# Patient Record
Sex: MALE | Race: WHITE
[De-identification: names, ages, dates, MRNs, and addresses within clinical notes are randomized per-mention and may not be internally consistent; named-entity substitution may affect disease eponyms.]

---

## 2024-03-05 PROBLEM — Z00.00 ENCOUNTER FOR PREVENTIVE HEALTH EXAMINATION: Status: ACTIVE | Noted: 2024-03-05

## 2024-03-07 ENCOUNTER — APPOINTMENT (OUTPATIENT)
Dept: ORTHOPEDIC SURGERY | Facility: CLINIC | Age: 69
End: 2024-03-07
Payer: OTHER MISCELLANEOUS

## 2024-03-07 VITALS
HEART RATE: 72 BPM | OXYGEN SATURATION: 97 % | DIASTOLIC BLOOD PRESSURE: 91 MMHG | SYSTOLIC BLOOD PRESSURE: 146 MMHG | TEMPERATURE: 97.8 F | BODY MASS INDEX: 31.83 KG/M2 | WEIGHT: 256 LBS | HEIGHT: 75 IN

## 2024-03-07 PROCEDURE — 72110 X-RAY EXAM L-2 SPINE 4/>VWS: CPT

## 2024-03-07 PROCEDURE — 99204 OFFICE O/P NEW MOD 45 MIN: CPT

## 2024-03-08 NOTE — HISTORY OF PRESENT ILLNESS
[No] : The patient is currently not working. [de-identified] : Judd presents with severe daily back and leg pain. He has attempted conservative treatment methods including activity modification, anti-inflammatory pain medications, acupuncture, chiropractic treatment, and two epidural steroid injections. He is scheduled for a third epidural steroid injection on Monday.

## 2024-03-08 NOTE — PLAN
[FreeTextEntry1] : - Administer a third epidural steroid injection on Monday.   - If symptoms persist after the third injection, repeat MRI in anticipation of decompression and fusion.   - Continue with current conservative treatments including activity modification and anti-inflammatory pain medication.   - Follow-up appointment to monitor progress post-injection and to determine the need for decompression and fusion.

## 2024-03-08 NOTE — END OF VISIT
[FreeTextEntry3] : I Diana Obrien, acting as scribe, attest that this documentation has been prepared under the direction and in the presence of Provider Weston Dominguez MD.   I was physically present during the service to the patient and/or personally examined the patient and I was directly involved in the management plan and recommendations of the care provided to the patient.   I Dr. Dominguez, reviewed the history, the physical exam, and plan as documented by the PA. The documentation recorded by the PA, in my presence, accurately reflects the service I personally performed, and the decisions made by me with my edits as appropriate.  Weston Dominguez MD

## 2024-03-08 NOTE — ASSESSMENT
[Physical Disability Temporary Total] : temporarily total disabled [FreeTextEntry1] : Judd is experiencing severe daily back and leg pain, which has not significantly improved with conservative treatment methods. He is scheduled for a third epidural steroid injection.

## 2024-04-30 ENCOUNTER — RX RENEWAL (OUTPATIENT)
Age: 69
End: 2024-04-30

## 2024-05-09 ENCOUNTER — APPOINTMENT (OUTPATIENT)
Dept: ORTHOPEDIC SURGERY | Facility: CLINIC | Age: 69
End: 2024-05-09
Payer: OTHER MISCELLANEOUS

## 2024-05-09 DIAGNOSIS — M54.50 LOW BACK PAIN, UNSPECIFIED: ICD-10-CM

## 2024-05-09 PROCEDURE — 99214 OFFICE O/P EST MOD 30 MIN: CPT

## 2024-05-24 RX ORDER — OXYCODONE AND ACETAMINOPHEN 5; 325 MG/1; MG/1
5-325 TABLET ORAL
Qty: 40 | Refills: 0 | Status: ACTIVE | COMMUNITY
Start: 2024-05-09 | End: 1900-01-01

## 2024-05-28 ENCOUNTER — NON-APPOINTMENT (OUTPATIENT)
Age: 69
End: 2024-05-28

## 2024-05-30 ENCOUNTER — NON-APPOINTMENT (OUTPATIENT)
Age: 69
End: 2024-05-30

## 2024-06-03 RX ORDER — TRAMADOL HYDROCHLORIDE 50 MG/1
50 TABLET, COATED ORAL
Qty: 60 | Refills: 0 | Status: ACTIVE | COMMUNITY
Start: 2024-06-03 | End: 1900-01-01

## 2024-06-03 NOTE — PHYSICAL EXAM
[de-identified] : In the interim, we reviewed his x-rays of his lumbar spine, which show disc height narrowing without evidence of gross deformity or instability.

## 2024-06-03 NOTE — PLAN
Detail Level: Detailed [FreeTextEntry1] : I recommended a new MRI and followed with the aforementioned films. Ultimately, following his knee replacements, he will likely require a decompression and fusion of the lumbar sacral spine. Prn percocet vs tramadol as needed for pain.  Add 42046 Cpt? (Important Note: In 2017 The Use Of 67980 Is Being Tracked By Cms To Determine Future Global Period Reimbursement For Global Periods): yes

## 2024-06-03 NOTE — ADDENDUM
[FreeTextEntry1] :  Documented by Talita Reyes acting as a scribe for Dr. Weston Dominguez. 05/09/2024

## 2024-06-03 NOTE — HISTORY OF PRESENT ILLNESS
[de-identified] : Judd Brito is here for follow-up regarding his lumbar spine. He has predominantly lower back pain, but it does radiate into his legs. He did well with bilateral hip replacements. He needs bilateral knee replacements. He saw Dr. Mahesh Jacome regarding this. He will first fix his left knee. Shortly thereafter, he will need right-sided total knee replacement. He has taken percocet in the past with moderate relief of pain.

## 2024-06-03 NOTE — END OF VISIT
[FreeTextEntry3] :  Documented by Talita Reyes acting as a scribe for Dr. Weston Dominguez. 05/09/2024   All medical record entries made by the Scribe were at my, Dr. Weston Dominguez. direction and personally dictated by me on 05/09/2024. I have reviewed the chart and agree that the record accurately reflects my personal performance of the history, physical exam, assessment and plan. I have also personally directed, reviewed, and agreed with the chart.

## 2024-06-05 ENCOUNTER — RX RENEWAL (OUTPATIENT)
Age: 69
End: 2024-06-05

## 2024-06-05 RX ORDER — DICLOFENAC SODIUM 75 MG/1
75 TABLET, DELAYED RELEASE ORAL
Qty: 60 | Refills: 0 | Status: ACTIVE | COMMUNITY
Start: 2024-03-28 | End: 1900-01-01

## 2024-07-17 ENCOUNTER — RX RENEWAL (OUTPATIENT)
Age: 69
End: 2024-07-17

## 2024-08-01 ENCOUNTER — APPOINTMENT (OUTPATIENT)
Dept: ORTHOPEDIC SURGERY | Facility: CLINIC | Age: 69
End: 2024-08-01
Payer: OTHER MISCELLANEOUS

## 2024-08-01 DIAGNOSIS — M54.50 LOW BACK PAIN, UNSPECIFIED: ICD-10-CM

## 2024-08-01 PROCEDURE — 99214 OFFICE O/P EST MOD 30 MIN: CPT

## 2024-08-02 NOTE — PHYSICAL EXAM
[de-identified] : MRI(06/05/2024): Consistent with multilevel neuroforaminal stenosis. Most consistent with the symptoms is the stenosis of the thyroid at L3-4 and L4-5 at the right with associated disc herniation.

## 2024-08-02 NOTE — PHYSICAL EXAM
[de-identified] : MRI(06/05/2024): Consistent with multilevel neuroforaminal stenosis. Most consistent with the symptoms is the stenosis of the thyroid at L3-4 and L4-5 at the right with associated disc herniation.

## 2024-08-02 NOTE — HISTORY OF PRESENT ILLNESS
[de-identified] : Judd Brito is here with prominently lower back and right-sided thigh pain. He has an MRI for review today. I have discouraged him from surgical intervention and referred him back to Dr. Arcenio Mace for transforaminal epidural steroid injection, right L3-4 and L4-5. The patient is anticipating a left total knee replacement with Dr. Jacome in the coming months. I'm hopeful that re-aligning his joints will improve his gait and hopefully his back and disc herniation related symptoms.

## 2024-08-02 NOTE — HISTORY OF PRESENT ILLNESS
[de-identified] : Judd Brito is here with prominently lower back and right-sided thigh pain. He has an MRI for review today. I have discouraged him from surgical intervention and referred him back to Dr. Arcenio Mace for transforaminal epidural steroid injection, right L3-4 and L4-5. The patient is anticipating a left total knee replacement with Dr. Jacome in the coming months. I'm hopeful that re-aligning his joints will improve his gait and hopefully his back and disc herniation related symptoms.

## 2024-08-02 NOTE — ADDENDUM
[FreeTextEntry1] : I, Mitra Estrada (scribe) assisted in filling out this chart under the dictation of Weston Dominguez on 08/01/2024.

## 2024-08-02 NOTE — HISTORY OF PRESENT ILLNESS
[de-identified] : Judd Brito is here with prominently lower back and right-sided thigh pain. He has an MRI for review today. I have discouraged him from surgical intervention and referred him back to Dr. Arcenio Mace for transforaminal epidural steroid injection, right L3-4 and L4-5. The patient is anticipating a left total knee replacement with Dr. Jacome in the coming months. I'm hopeful that re-aligning his joints will improve his gait and hopefully his back and disc herniation related symptoms.

## 2024-08-02 NOTE — END OF VISIT
[FreeTextEntry3] : Documented by Mitra Estrada acting as a scribe for Weston Dominguez on 08/01/2024  All medical record entries made by the Scribe were at my, Dr. Weston Dominguez direction and personally dictated by me on 08/01/2024. I have reviewed the chart and agree that the record accurately reflects my personal performance of the history, physical exam, assessment and plan. I have also personally directed, reviewed, and agreed with the chart.

## 2024-08-02 NOTE — PLAN
[TextEntry] : I have discouraged him from surgical intervention at this point as surgery of this genre would entail decompression and fusion and his propensity for disc degeneration, disc herniation predisposes him to a 100% risk of adjacent segment degeneration and does not leave us a good place to stop the fusion. Following our discussion, the patient verbalized understanding and was to proceed with ongoing conservative treatment. He will follow-up with us in three months' time.

## 2024-08-02 NOTE — PHYSICAL EXAM
[de-identified] : MRI(06/05/2024): Consistent with multilevel neuroforaminal stenosis. Most consistent with the symptoms is the stenosis of the thyroid at L3-4 and L4-5 at the right with associated disc herniation.

## 2024-08-05 ENCOUNTER — APPOINTMENT (OUTPATIENT)
Dept: ORTHOPEDIC SURGERY | Facility: CLINIC | Age: 69
End: 2024-08-05

## 2024-08-13 ENCOUNTER — RX RENEWAL (OUTPATIENT)
Age: 69
End: 2024-08-13

## 2024-09-10 ENCOUNTER — TRANSCRIPTION ENCOUNTER (OUTPATIENT)
Age: 69
End: 2024-09-10

## 2024-09-10 VITALS
HEIGHT: 75 IN | OXYGEN SATURATION: 97 % | DIASTOLIC BLOOD PRESSURE: 83 MMHG | HEART RATE: 69 BPM | SYSTOLIC BLOOD PRESSURE: 143 MMHG | RESPIRATION RATE: 18 BRPM | WEIGHT: 255.74 LBS | TEMPERATURE: 98 F

## 2024-09-10 RX ORDER — FLU VACCINE TS 2012-2013(5YR+) 45MCG/.5ML
0.5 VIAL (ML) INTRAMUSCULAR ONCE
Refills: 0 | Status: DISCONTINUED | OUTPATIENT
Start: 2024-09-11 | End: 2024-09-12

## 2024-09-10 RX ORDER — POVIDONE-IODINE 10 %
1 SOLUTION, NON-ORAL TOPICAL ONCE
Refills: 0 | Status: COMPLETED | OUTPATIENT
Start: 2024-09-11 | End: 2024-09-11

## 2024-09-10 NOTE — PRE-ANESTHESIA EVALUATION ADULT - NSANTHOSAYNRD_GEN_A_CORE
No. YASMANI screening performed.  STOP BANG Legend: 0-2 = LOW Risk; 3-4 = INTERMEDIATE Risk; 5-8 = HIGH Risk

## 2024-09-10 NOTE — PATIENT PROFILE ADULT - DO YOU EVER NEED HELP READING HOSPITAL MATERIALS?
Chronic Condition Documentation : Stable based on symptoms and exam.  Continue established treatment plan and follow-up at least yearly.   no

## 2024-09-10 NOTE — H&P ADULT - PROBLEM SELECTOR PLAN 1
Admit to Orthopaedic Service.  Presents today for elective Left TKR  Pt medically stable and cleared for procedure today by Dr. Chun

## 2024-09-10 NOTE — H&P ADULT - NSHPLABSRESULTS_GEN_ALL_CORE
Preop CBC, BMP, PT/PTT/INR, UA within normal limits- reviewed by medical clearance.  Cr: 0.86  H/H: 13.9/42.2  Preop EKG: NSR, 70bpm, reviewed by medical clearance.  CXR: Within normal limits, per medical clearance.  3M DOS

## 2024-09-10 NOTE — H&P ADULT - NSICDXPASTSURGICALHX_GEN_ALL_CORE_FT
PAST SURGICAL HISTORY:  H/O foot surgery B/L    H/O operation on finger cut-and reattached  middle finger and ring    History of hip surgery B/L

## 2024-09-10 NOTE — PATIENT PROFILE ADULT - NSPRESCRALCSCORE_GEN_A_NUR_CAL
Patient Education     Pharyngitis (Sore Throat), Report Pending     Pharyngitis (sore throat) is often due to a virus. It can also be caused by strep (streptococcus) bacteria. This is often called strep throat. Both viral and strep infections can cause throat pain that is worse when swallowing, aching all over, headache, and fever. Both types of infections are contagious. They may be spread by coughing, kissing, or touching others after touching your mouth or nose.   A test has been done to find out if you or your child have strep throat. Often a rapid strep test can be done which gives immediate results and treatment can begin right away. A throat culture may also be done. The culture results take longer. If you have a virus, the culture results will be negative. The facility will call with your culture results. Call this facility or your healthcare provider if you were not given your rapid test results for strep. If a test is positive for strep infection, you will need to take an antibiotic. An antibiotic is a medicine used to treat a bacterial infection. A prescription can be called into your pharmacy or a written copy will be given to you at that time. If both tests are negative, you likely have a virus, usually viral pharyngitis. This does not need to be treated with antibiotics. Until you receive the results of the rapid strep test or the throat culture, you should stay home from work. If your child is being tested, he or she should stay home from school.   Home care    Rest at home. Drink plenty of fluids so you won't get dehydrated.    If the test is positive for strep, you or your child should not go to work or school for the first 24 hours of taking the antibiotics or as directed by the healthcare provider. After this time, you or your child will not be contagious. You or your child can then return to work or school when feeling better or as directed by this facility or your healthcare provider.     Use  the antibiotic medicine (often penicillin or amoxicillin) for the full 10 days or as directed by the healthcare provider. Don't stop the medicine even if you or your child feel better. This is very important to make sure the infection is fully treated. It's also important to prevent medicine-resistant germs from growing. Treatment for 10 days is also the best way to prevent rheumatic fever which affects the heart and other parts of the body. If you or your child were given an antibiotic shot, the healthcare provider will tell you if additional antibiotics are needed.    Use throat lozenges or numbing throat sprays to help reduce pain. Gargling with warm salt water will also help reduce throat pain. Dissolve 1/2 teaspoon of salt in 1 glass of warm water. Discuss this treatment with your healthcare provider.    Children can sip on juice or ice pops. Children 5 years and older can also suck on a lollipop or hard candy.    Don't eat salty or spicy foods or give them to your child. These can irritate the throat.  Other medicine for a child:  You can give your child acetaminophen for fever, fussiness, or discomfort. In babies over 6 months of age, you may use ibuprofen instead of acetaminophen. If your child has chronic liver or kidney disease or ever had a stomach ulcer or gastrointestinal bleeding, talk with your child s healthcare provider before giving these medicines. Aspirin should never be used by any child under 18 years of age who has a fever. It may cause severe liver damage. Always contact your child's healthcare provider before giving him or her over-the-counter medicines for the first time.   Other medicine for an adult: You may use acetaminophen or ibuprofen to control pain or fever, unless another medicine was prescribed for this. If you have chronic liver or kidney disease or ever had a stomach ulcer or gastrointestinal bleeding, talk with your healthcare provider before using these medicines.   Follow-up  "care  Follow up with your healthcare provider or our staff if you or your child don't feel or get better within 72 hours or as directed.   When to get medical advice  Call your healthcare provider right away if any of these occur:     Your child has a fever (see \"Fever and children,\" below)    You have a fever of 100.4 F (38 C) or higher, or as directed    New or worsening ear pain, sinus pain, or headache    Painful lumps in the back of neck    Stiff or swollen neck    Lymph nodes are getting larger or swelling of the neck    Can t open mouth wide due to throat pain    Signs of dehydration, such as very dark urine or no urine or sunken eyes    Noisy breathing    Muffled voice    New rash    Symptoms are worse    New symptoms develop  Call 911  Call 911 if you have any of the following symptoms     Can't swallow, especially saliva, with a lot of drooling    Trouble or difficulty breathing or wheezing    Feeling faint or dizzy    Can't talk    Feeling of doom  Prevention  Here are steps you can take to help prevent an infection:     Keep good hand washing habits.    Don t have close contact with people who have sore throats, colds, or other upper respiratory infections.    Don t smoke, and stay away from secondhand smoke.    Stay up to date with of your vaccines.  Fever and children  Use a digital thermometer to check your child s temperature. Don t use a mercury thermometer. There are different kinds and uses of digital thermometers. They include:     Rectal. For children younger than 3 years, a rectal temperature is the most accurate.    Forehead (temporal). This works for children age 3 months and older. If a child under 3 months old has signs of illness, this can be used for a first pass. The provider may want to confirm with a rectal temperature.    Ear (tympanic). Ear temperatures are accurate after 6 months of age, but not before.    Armpit (axillary). This is the least reliable but may be used for a first " pass to check a child of any age with signs of illness. The provider may want to confirm with a rectal temperature.    Mouth (oral). Don t use a thermometer in your child s mouth until he or she is at least 4 years old.  Use the rectal thermometer with care. Follow the product maker s directions for correct use. Insert it gently. Label it and make sure it s not used in the mouth. It may pass on germs from the stool. If you don t feel OK using a rectal thermometer, ask the healthcare provider what type to use instead. When you talk with any healthcare provider about your child s fever, tell him or her which type you used.   Below are guidelines to know if your young child has a fever. Your child s healthcare provider may give you different numbers for your child. Follow your provider s specific instructions.   Fever readings for a baby under 3 months old:     First, ask your child s healthcare provider how you should take the temperature.    Rectal or forehead: 100.4 F (38 C) or higher    Armpit: 99 F (37.2 C) or higher  Fever readings for a child age 3 months to 36 months (3 years):     Rectal, forehead, or ear: 102 F (38.9 C) or higher    Armpit: 101 F (38.3 C) or higher  Call the healthcare provider in these cases:    Repeated temperature of 104 F (40 C) or higher in a child of any age    Fever of 100.4  (38 C) or higher in a baby younger than 3 months    Fever that lasts more than 24 hours in a child under age 2    Fever that lasts for 3 days in a child age 2 or older    [employer] last reviewed this educational content on 2/1/2020 2000-2021 [employer]. All rights reserved. This information is not intended as a substitute for professional medical care. Always follow your healthcare professional's instructions.            4

## 2024-09-10 NOTE — H&P ADULT - NSHPPHYSICALEXAM_GEN_ALL_CORE
Gen: Alert and oriented, NAD  MSK: Decreased ROM to left knee 2/2 pain     Rest of PE per medical clearance note Gen: Alert and oriented, NAD  MSK: Decreased ROM to left knee 2/2 pain   Skin without erythema, ecchymosis, left 5th metatarsal scab from 10 days ago, healing   Calves soft, nontender bilaterally   Sensation intact to light touch bilateral lower extremities  Pulses: brisk capillary refill  EHL/FHL/TA/GS 5/5 bilaterally      Rest of PE per medical clearance note

## 2024-09-10 NOTE — PRE-OP CHECKLIST - 1.
----- Message from Dhruv Marlow MD sent at 4/9/2020  1:43 PM CDT -----  Hemoglobin A1 c 7.8 on the higher side please watch the diet more closely and exercise   visitation policy explained

## 2024-09-10 NOTE — H&P ADULT - HISTORY OF PRESENT ILLNESS
68yo male with left knee osteoarthritis    Presents today for elective left total knee arthroplasty with Dr. Jacome 70yo male with left knee pain x over 2 years. He reports that the pain began spontaneously and without accident or trauma. Patient says that the pain persists and/or is worsening despite conservative measures of physical therapy and increasingly unresponsive to use of medications. Patient ambulates WITHOUT the use of a cane or walker for assistance at home. Denies numbness/tingling of lower extremities. Patient is able to ambulate 2 blocks without CP or SOB.     Denies history of blood clots or seizures. Denies chest pain, shortness of breath, nausea or vomiting today.    Patient HAS NOT been using opioid pain medications on a regular basis for more than 90 days prior to the date of surgery.     Presents today for elective left total knee arthroplasty with Dr. Jacome

## 2024-09-10 NOTE — PATIENT PROFILE ADULT - HAVE YOU EXPERIENCED VIOLENCE OR A TRAUMATIC EVENT?
Consult visit for discussion of Advance Care Planning/ Living Will. Pt states she has been meaning to complete but has not.  Goals and purpose of ACP discussed and reviewed.  Her questions answered to her satisfaction.  She requested that the document be left for her to review.  Contact numbers for facilitators  Given if she desires further assistance.  
no

## 2024-09-11 ENCOUNTER — INPATIENT (INPATIENT)
Facility: HOSPITAL | Age: 69
LOS: 0 days | Discharge: HOME HEALTH | DRG: 470 | End: 2024-09-12
Payer: OTHER MISCELLANEOUS

## 2024-09-11 DIAGNOSIS — M17.12 UNILATERAL PRIMARY OSTEOARTHRITIS, LEFT KNEE: ICD-10-CM

## 2024-09-11 DIAGNOSIS — I10 ESSENTIAL (PRIMARY) HYPERTENSION: ICD-10-CM

## 2024-09-11 DIAGNOSIS — Z98.890 OTHER SPECIFIED POSTPROCEDURAL STATES: Chronic | ICD-10-CM

## 2024-09-11 PROCEDURE — 73560 X-RAY EXAM OF KNEE 1 OR 2: CPT | Mod: 26,LT

## 2024-09-11 DEVICE — STEM EXT PERSONA 14MM PLUS 30M: Type: IMPLANTABLE DEVICE | Site: LEFT | Status: FUNCTIONAL

## 2024-09-11 DEVICE — ZIMMER FEMALE HEX SCREW MAGNETIC 2.5MM X 25MM: Type: IMPLANTABLE DEVICE | Site: LEFT | Status: FUNCTIONAL

## 2024-09-11 DEVICE — SURF ART PERSONA LT 12GH 10MM: Type: IMPLANTABLE DEVICE | Site: LEFT | Status: FUNCTIONAL

## 2024-09-11 DEVICE — CELLERATE SURGICAL POWDER RX 5GM: Type: IMPLANTABLE DEVICE | Site: LEFT | Status: FUNCTIONAL

## 2024-09-11 DEVICE — COMP FEM PERSONA LT PS CMT CCR STD SZ 12: Type: IMPLANTABLE DEVICE | Site: LEFT | Status: FUNCTIONAL

## 2024-09-11 DEVICE — STEM TIB PSN SZ H L 5 DEG: Type: IMPLANTABLE DEVICE | Site: LEFT | Status: FUNCTIONAL

## 2024-09-11 DEVICE — IMPLANTABLE DEVICE: Type: IMPLANTABLE DEVICE | Site: LEFT | Status: FUNCTIONAL

## 2024-09-11 DEVICE — ZIMMER/NEXGEN SMOOTH PIN 3.2X75MM: Type: IMPLANTABLE DEVICE | Site: LEFT | Status: FUNCTIONAL

## 2024-09-11 DEVICE — ZIMMER/NEXGEN HEX HEAD SCREW 3.5MM: Type: IMPLANTABLE DEVICE | Site: LEFT | Status: FUNCTIONAL

## 2024-09-11 DEVICE — PATELLA PERSONA VE CEMENTED 35MM: Type: IMPLANTABLE DEVICE | Site: LEFT | Status: FUNCTIONAL

## 2024-09-11 RX ORDER — HYDROMORPHONE HYDROCHLORIDE 2 MG/1
0.5 TABLET ORAL
Refills: 0 | Status: COMPLETED | OUTPATIENT
Start: 2024-09-11 | End: 2024-09-18

## 2024-09-11 RX ORDER — IRBESARTAN AND HYDROCHLOROTHIAZIDE 300; 12.5 MG/1; MG/1
1 TABLET ORAL
Refills: 0 | DISCHARGE

## 2024-09-11 RX ORDER — CHLORHEXIDINE GLUCONATE 40 MG/ML
1 SOLUTION TOPICAL ONCE
Refills: 0 | Status: COMPLETED | OUTPATIENT
Start: 2024-09-11 | End: 2024-09-11

## 2024-09-11 RX ORDER — FINASTERIDE 1 MG/1
1 TABLET, FILM COATED ORAL
Refills: 0 | DISCHARGE

## 2024-09-11 RX ORDER — ASPIRIN 81 MG
81 TABLET, DELAYED RELEASE (ENTERIC COATED) ORAL
Refills: 0 | Status: DISCONTINUED | OUTPATIENT
Start: 2024-09-11 | End: 2024-09-12

## 2024-09-11 RX ORDER — KETOROLAC TROMETHAMINE 30 MG/ML
15 INJECTION, SOLUTION INTRAMUSCULAR EVERY 6 HOURS
Refills: 0 | Status: DISCONTINUED | OUTPATIENT
Start: 2024-09-11 | End: 2024-09-12

## 2024-09-11 RX ORDER — OXYCODONE HYDROCHLORIDE 5 MG/1
10 TABLET ORAL
Refills: 0 | Status: DISCONTINUED | OUTPATIENT
Start: 2024-09-11 | End: 2024-09-12

## 2024-09-11 RX ORDER — CEFAZOLIN SODIUM 2 G/100ML
2000 INJECTION, SOLUTION INTRAVENOUS EVERY 8 HOURS
Refills: 0 | Status: COMPLETED | OUTPATIENT
Start: 2024-09-11 | End: 2024-09-11

## 2024-09-11 RX ORDER — CELECOXIB 400 MG/1
200 CAPSULE ORAL ONCE
Refills: 0 | Status: COMPLETED | OUTPATIENT
Start: 2024-09-11 | End: 2024-09-11

## 2024-09-11 RX ORDER — ACETAMINOPHEN 325 MG/1
1000 TABLET ORAL EVERY 8 HOURS
Refills: 0 | Status: DISCONTINUED | OUTPATIENT
Start: 2024-09-11 | End: 2024-09-12

## 2024-09-11 RX ORDER — HYDROMORPHONE HYDROCHLORIDE 2 MG/1
0.5 TABLET ORAL
Refills: 0 | Status: DISCONTINUED | OUTPATIENT
Start: 2024-09-11 | End: 2024-09-12

## 2024-09-11 RX ORDER — ACETAMINOPHEN 325 MG/1
1000 TABLET ORAL ONCE
Refills: 0 | Status: COMPLETED | OUTPATIENT
Start: 2024-09-11 | End: 2024-09-11

## 2024-09-11 RX ORDER — CELECOXIB 400 MG/1
200 CAPSULE ORAL EVERY 12 HOURS
Refills: 0 | Status: DISCONTINUED | OUTPATIENT
Start: 2024-09-11 | End: 2024-09-12

## 2024-09-11 RX ORDER — SENNA 187 MG
2 TABLET ORAL AT BEDTIME
Refills: 0 | Status: DISCONTINUED | OUTPATIENT
Start: 2024-09-11 | End: 2024-09-12

## 2024-09-11 RX ORDER — APREPITANT 40 MG/1
40 CAPSULE ORAL ONCE
Refills: 0 | Status: COMPLETED | OUTPATIENT
Start: 2024-09-11 | End: 2024-09-11

## 2024-09-11 RX ORDER — FINASTERIDE 1 MG/1
5 TABLET, FILM COATED ORAL DAILY
Refills: 0 | Status: DISCONTINUED | OUTPATIENT
Start: 2024-09-11 | End: 2024-09-12

## 2024-09-11 RX ORDER — OXYCODONE HYDROCHLORIDE 5 MG/1
5 TABLET ORAL
Refills: 0 | Status: DISCONTINUED | OUTPATIENT
Start: 2024-09-11 | End: 2024-09-12

## 2024-09-11 RX ORDER — ONDANSETRON 2 MG/ML
4 INJECTION, SOLUTION INTRAMUSCULAR; INTRAVENOUS EVERY 6 HOURS
Refills: 0 | Status: DISCONTINUED | OUTPATIENT
Start: 2024-09-11 | End: 2024-09-12

## 2024-09-11 RX ADMIN — CELECOXIB 200 MILLIGRAM(S): 400 CAPSULE ORAL at 06:38

## 2024-09-11 RX ADMIN — ACETAMINOPHEN 1000 MILLIGRAM(S): 325 TABLET ORAL at 13:53

## 2024-09-11 RX ADMIN — OXYCODONE HYDROCHLORIDE 10 MILLIGRAM(S): 5 TABLET ORAL at 20:56

## 2024-09-11 RX ADMIN — ACETAMINOPHEN 1000 MILLIGRAM(S): 325 TABLET ORAL at 21:32

## 2024-09-11 RX ADMIN — Medication 1 APPLICATION(S): at 06:57

## 2024-09-11 RX ADMIN — CELECOXIB 200 MILLIGRAM(S): 400 CAPSULE ORAL at 20:16

## 2024-09-11 RX ADMIN — Medication 81 MILLIGRAM(S): at 18:50

## 2024-09-11 RX ADMIN — HYDROMORPHONE HYDROCHLORIDE 0.5 MILLIGRAM(S): 2 TABLET ORAL at 12:42

## 2024-09-11 RX ADMIN — FINASTERIDE 5 MILLIGRAM(S): 1 TABLET, FILM COATED ORAL at 13:39

## 2024-09-11 RX ADMIN — KETOROLAC TROMETHAMINE 15 MILLIGRAM(S): 30 INJECTION, SOLUTION INTRAMUSCULAR at 23:53

## 2024-09-11 RX ADMIN — Medication 1 TABLET(S): at 13:39

## 2024-09-11 RX ADMIN — OXYCODONE HYDROCHLORIDE 10 MILLIGRAM(S): 5 TABLET ORAL at 19:56

## 2024-09-11 RX ADMIN — KETOROLAC TROMETHAMINE 15 MILLIGRAM(S): 30 INJECTION, SOLUTION INTRAMUSCULAR at 18:50

## 2024-09-11 RX ADMIN — OXYCODONE HYDROCHLORIDE 10 MILLIGRAM(S): 5 TABLET ORAL at 14:39

## 2024-09-11 RX ADMIN — CHLORHEXIDINE GLUCONATE 1 APPLICATION(S): 40 SOLUTION TOPICAL at 06:40

## 2024-09-11 RX ADMIN — KETOROLAC TROMETHAMINE 15 MILLIGRAM(S): 30 INJECTION, SOLUTION INTRAMUSCULAR at 12:42

## 2024-09-11 RX ADMIN — ACETAMINOPHEN 1000 MILLIGRAM(S): 325 TABLET ORAL at 13:39

## 2024-09-11 RX ADMIN — KETOROLAC TROMETHAMINE 15 MILLIGRAM(S): 30 INJECTION, SOLUTION INTRAMUSCULAR at 19:19

## 2024-09-11 RX ADMIN — CEFAZOLIN SODIUM 100 MILLIGRAM(S): 2 INJECTION, SOLUTION INTRAVENOUS at 14:06

## 2024-09-11 RX ADMIN — KETOROLAC TROMETHAMINE 15 MILLIGRAM(S): 30 INJECTION, SOLUTION INTRAMUSCULAR at 11:46

## 2024-09-11 RX ADMIN — CELECOXIB 200 MILLIGRAM(S): 400 CAPSULE ORAL at 18:50

## 2024-09-11 RX ADMIN — OXYCODONE HYDROCHLORIDE 10 MILLIGRAM(S): 5 TABLET ORAL at 13:39

## 2024-09-11 RX ADMIN — ACETAMINOPHEN 1000 MILLIGRAM(S): 325 TABLET ORAL at 06:39

## 2024-09-11 RX ADMIN — HYDROMORPHONE HYDROCHLORIDE 0.5 MILLIGRAM(S): 2 TABLET ORAL at 11:50

## 2024-09-11 RX ADMIN — CEFAZOLIN SODIUM 100 MILLIGRAM(S): 2 INJECTION, SOLUTION INTRAVENOUS at 22:18

## 2024-09-11 RX ADMIN — APREPITANT 40 MILLIGRAM(S): 40 CAPSULE ORAL at 06:38

## 2024-09-11 RX ADMIN — Medication 2 TABLET(S): at 21:32

## 2024-09-11 RX ADMIN — ACETAMINOPHEN 1000 MILLIGRAM(S): 325 TABLET ORAL at 22:32

## 2024-09-11 NOTE — PHYSICAL THERAPY INITIAL EVALUATION ADULT - ACTIVE RANGE OF MOTION EXAMINATION, REHAB EVAL
besides L knee 0-45 deg 2/2 surgical intervention/bilateral upper extremity Active ROM was WFL (within functional limits)/bilateral  lower extremity Active ROM was WFL (within functional limits)

## 2024-09-11 NOTE — CONSULT NOTE ADULT - ASSESSMENT
70 YO male with PMH HTN and two years of left knee pain, failed on conservative measures, now seen post operatively feeling comfortable, pain is controlled on pain regiment in knee, patient is denying SOB CP Dyspnea palpitations N/V fevers / chills .   Continue valsartan in place of irbesartan, with holding parameters,   check creatinine levels post op BMP  continue HCTZ with holding parameters  finasteride to continue  pain regimen, bowel regimen, DVT ppx, edith op antibiotics

## 2024-09-11 NOTE — CONSULT NOTE ADULT - SUBJECTIVE AND OBJECTIVE BOX
HPI:   70 YO male with PMH HTN and two years of left knee pain, failed on conservative measures, now seen post operatively feeling comfortable, pain is controlled on pain regiment in knee, patient is denying SOB CP Dyspnea palpitations N/V fevers / chills .     ROS: All 12 systems reviewed and negative except for HPI      left total knee arthroplasty with Dr. Jacome (10 Sep 2024 11:11)        PAST MEDICAL & SURGICAL HISTORY:  HTN (hypertension)      History of hip surgery  B/L      H/O foot surgery  B/L      H/O operation on finger  cut-and reattached  middle finger and ring          Allergies    No Known Allergies    Intolerances        MEDICATIONS  (STANDING):  acetaminophen     Tablet .. 1000 milliGRAM(s) Oral every 8 hours  aspirin  chewable 81 milliGRAM(s) Oral two times a day  ceFAZolin   IVPB 2000 milliGRAM(s) IV Intermittent every 8 hours  celecoxib 200 milliGRAM(s) Oral every 12 hours  finasteride 5 milliGRAM(s) Oral daily  influenza  Vaccine (HIGH DOSE) 0.5 milliLiter(s) IntraMuscular once  ketorolac   Injectable 15 milliGRAM(s) IV Push every 6 hours  lactated ringers. 1000 milliLiter(s) (75 mL/Hr) IV Continuous <Continuous>  multivitamin 1 Tablet(s) Oral daily  senna 2 Tablet(s) Oral at bedtime    MEDICATIONS  (PRN):  HYDROmorphone  Injectable 0.5 milliGRAM(s) IV Push every 3 hours PRN Breakthrough pain  ondansetron Injectable 4 milliGRAM(s) IV Push every 6 hours PRN Nausea and/or Vomiting  oxyCODONE    IR 10 milliGRAM(s) Oral every 3 hours PRN Severe Pain (7 - 10)  oxyCODONE    IR 5 milliGRAM(s) Oral every 3 hours PRN Moderate Pain (4 - 6)      SOCIAL HISTORY:    FAMILY HISTORY:        Vital Signs Last 24 Hrs  T(C): 36.6 (11 Sep 2024 13:06), Max: 36.9 (10 Sep 2024 18:11)  T(F): 97.9 (11 Sep 2024 13:06), Max: 97.9 (11 Sep 2024 13:06)  HR: 76 (11 Sep 2024 13:06) (46 - 76)  BP: 136/77 (11 Sep 2024 13:06) (97/56 - 143/83)  BP(mean): 94 (11 Sep 2024 11:55) (71 - 94)  RR: 18 (11 Sep 2024 13:06) (12 - 24)  SpO2: 98% (11 Sep 2024 13:06) (92% - 100%)    Parameters below as of 11 Sep 2024 13:06  Patient On (Oxygen Delivery Method): room air        I&O's Summary    11 Sep 2024 07:01  -  11 Sep 2024 15:24  --------------------------------------------------------  IN: 1360 mL / OUT: 100 mL / NET: 1260 mL        LABS:                CAPILLARY BLOOD GLUCOSE          Cultures:      PHYSICAL EXAM:  General: NAD, resting comfortably  HEENT: NC/AT, EOMI, normal hearing, no oral lesions, no LAD, neck supple  Pulmonary: Normal resp effort, CTA-B  Cardiovascular: NSR, no murmurs  Abdominal: Soft, ND/NT, no organomegaly  Extremities: (+) DP/PT pulses. s/p L TKA mild edema   Neuro: A/O x 3, CNs II-XII grossly intact, normal sensation, no focal deficits  Pulses: Palpable distal pulses    RADIOLOGY & ADDITIONAL STUDIES:      ASSESSMENT:      PLAN:

## 2024-09-11 NOTE — PHYSICAL THERAPY INITIAL EVALUATION ADULT - ADDITIONAL COMMENTS
Prior to admission pt reports living with wife in a private home 3 steps to enter and 15 steps inside, indep with ADLs and mobility, no use of AD. Pt has the following AD; ANJALI

## 2024-09-12 ENCOUNTER — TRANSCRIPTION ENCOUNTER (OUTPATIENT)
Age: 69
End: 2024-09-12

## 2024-09-12 VITALS
RESPIRATION RATE: 17 BRPM | OXYGEN SATURATION: 98 % | HEART RATE: 67 BPM | SYSTOLIC BLOOD PRESSURE: 127 MMHG | DIASTOLIC BLOOD PRESSURE: 76 MMHG | TEMPERATURE: 97 F

## 2024-09-12 LAB
ANION GAP SERPL CALC-SCNC: 7 MMOL/L — SIGNIFICANT CHANGE UP (ref 5–17)
BUN SERPL-MCNC: 17 MG/DL — SIGNIFICANT CHANGE UP (ref 7–23)
CALCIUM SERPL-MCNC: 9.2 MG/DL — SIGNIFICANT CHANGE UP (ref 8.4–10.5)
CHLORIDE SERPL-SCNC: 102 MMOL/L — SIGNIFICANT CHANGE UP (ref 96–108)
CO2 SERPL-SCNC: 28 MMOL/L — SIGNIFICANT CHANGE UP (ref 22–31)
CREAT SERPL-MCNC: 0.76 MG/DL — SIGNIFICANT CHANGE UP (ref 0.5–1.3)
EGFR: 99 ML/MIN/1.73M2 — SIGNIFICANT CHANGE UP
GLUCOSE SERPL-MCNC: 121 MG/DL — HIGH (ref 70–99)
HCT VFR BLD CALC: 36 % — LOW (ref 39–50)
HGB BLD-MCNC: 11.4 G/DL — LOW (ref 13–17)
MCHC RBC-ENTMCNC: 30.2 PG — SIGNIFICANT CHANGE UP (ref 27–34)
MCHC RBC-ENTMCNC: 31.7 GM/DL — LOW (ref 32–36)
MCV RBC AUTO: 95.2 FL — SIGNIFICANT CHANGE UP (ref 80–100)
NRBC # BLD: 0 /100 WBCS — SIGNIFICANT CHANGE UP (ref 0–0)
PLATELET # BLD AUTO: 269 K/UL — SIGNIFICANT CHANGE UP (ref 150–400)
POTASSIUM SERPL-MCNC: 5.1 MMOL/L — SIGNIFICANT CHANGE UP (ref 3.5–5.3)
POTASSIUM SERPL-SCNC: 5.1 MMOL/L — SIGNIFICANT CHANGE UP (ref 3.5–5.3)
RBC # BLD: 3.78 M/UL — LOW (ref 4.2–5.8)
RBC # FLD: 12.7 % — SIGNIFICANT CHANGE UP (ref 10.3–14.5)
SODIUM SERPL-SCNC: 137 MMOL/L — SIGNIFICANT CHANGE UP (ref 135–145)
WBC # BLD: 15.16 K/UL — HIGH (ref 3.8–10.5)
WBC # FLD AUTO: 15.16 K/UL — HIGH (ref 3.8–10.5)

## 2024-09-12 PROCEDURE — 36415 COLL VENOUS BLD VENIPUNCTURE: CPT

## 2024-09-12 PROCEDURE — 97161 PT EVAL LOW COMPLEX 20 MIN: CPT

## 2024-09-12 PROCEDURE — 73560 X-RAY EXAM OF KNEE 1 OR 2: CPT

## 2024-09-12 PROCEDURE — C1713: CPT

## 2024-09-12 PROCEDURE — 85027 COMPLETE CBC AUTOMATED: CPT

## 2024-09-12 PROCEDURE — C1776: CPT

## 2024-09-12 PROCEDURE — 97116 GAIT TRAINING THERAPY: CPT

## 2024-09-12 PROCEDURE — C1889: CPT

## 2024-09-12 PROCEDURE — 80048 BASIC METABOLIC PNL TOTAL CA: CPT

## 2024-09-12 RX ORDER — POLYETHYLENE GLYCOL 3350 17 G/17G
17 POWDER, FOR SOLUTION ORAL
Qty: 0 | Refills: 0 | DISCHARGE
Start: 2024-09-12

## 2024-09-12 RX ORDER — OXYCODONE HYDROCHLORIDE 5 MG/1
1 TABLET ORAL
Qty: 30 | Refills: 0
Start: 2024-09-12 | End: 2024-09-18

## 2024-09-12 RX ORDER — ACETAMINOPHEN 325 MG/1
2 TABLET ORAL
Qty: 0 | Refills: 0 | DISCHARGE
Start: 2024-09-12

## 2024-09-12 RX ORDER — SENNA 187 MG
2 TABLET ORAL
Qty: 0 | Refills: 0 | DISCHARGE
Start: 2024-09-12

## 2024-09-12 RX ORDER — ASPIRIN 81 MG
1 TABLET, DELAYED RELEASE (ENTERIC COATED) ORAL
Qty: 60 | Refills: 0
Start: 2024-09-12 | End: 2024-10-11

## 2024-09-12 RX ORDER — CELECOXIB 400 MG/1
1 CAPSULE ORAL
Qty: 28 | Refills: 0
Start: 2024-09-12 | End: 2024-09-25

## 2024-09-12 RX ORDER — LACTULOSE 10 G
10 PACKET (EA) ORAL DAILY
Refills: 0 | Status: DISCONTINUED | OUTPATIENT
Start: 2024-09-12 | End: 2024-09-12

## 2024-09-12 RX ORDER — POLYETHYLENE GLYCOL 3350 17 G/17G
17 POWDER, FOR SOLUTION ORAL DAILY
Refills: 0 | Status: DISCONTINUED | OUTPATIENT
Start: 2024-09-12 | End: 2024-09-12

## 2024-09-12 RX ORDER — DICLOFENAC POTASSIUM 25 MG/1
1 CAPSULE, LIQUID FILLED ORAL
Refills: 0 | DISCHARGE

## 2024-09-12 RX ORDER — SENNA 187 MG
2 TABLET ORAL
Qty: 14 | Refills: 0
Start: 2024-09-12 | End: 2024-09-18

## 2024-09-12 RX ORDER — POLYETHYLENE GLYCOL 3350 17 G/17G
17 POWDER, FOR SOLUTION ORAL
Qty: 1 | Refills: 0
Start: 2024-09-12

## 2024-09-12 RX ADMIN — Medication 10 GRAM(S): at 11:41

## 2024-09-12 RX ADMIN — OXYCODONE HYDROCHLORIDE 10 MILLIGRAM(S): 5 TABLET ORAL at 10:48

## 2024-09-12 RX ADMIN — ACETAMINOPHEN 1000 MILLIGRAM(S): 325 TABLET ORAL at 07:01

## 2024-09-12 RX ADMIN — CELECOXIB 200 MILLIGRAM(S): 400 CAPSULE ORAL at 10:25

## 2024-09-12 RX ADMIN — Medication 1 TABLET(S): at 11:40

## 2024-09-12 RX ADMIN — CELECOXIB 200 MILLIGRAM(S): 400 CAPSULE ORAL at 11:25

## 2024-09-12 RX ADMIN — ACETAMINOPHEN 1000 MILLIGRAM(S): 325 TABLET ORAL at 06:01

## 2024-09-12 RX ADMIN — OXYCODONE HYDROCHLORIDE 10 MILLIGRAM(S): 5 TABLET ORAL at 11:48

## 2024-09-12 RX ADMIN — POLYETHYLENE GLYCOL 3350 17 GRAM(S): 17 POWDER, FOR SOLUTION ORAL at 11:40

## 2024-09-12 RX ADMIN — KETOROLAC TROMETHAMINE 15 MILLIGRAM(S): 30 INJECTION, SOLUTION INTRAMUSCULAR at 06:16

## 2024-09-12 RX ADMIN — KETOROLAC TROMETHAMINE 15 MILLIGRAM(S): 30 INJECTION, SOLUTION INTRAMUSCULAR at 00:53

## 2024-09-12 RX ADMIN — KETOROLAC TROMETHAMINE 15 MILLIGRAM(S): 30 INJECTION, SOLUTION INTRAMUSCULAR at 06:01

## 2024-09-12 RX ADMIN — FINASTERIDE 5 MILLIGRAM(S): 1 TABLET, FILM COATED ORAL at 11:40

## 2024-09-12 RX ADMIN — Medication 81 MILLIGRAM(S): at 06:01

## 2024-09-12 NOTE — DISCHARGE NOTE PROVIDER - HOSPITAL COURSE
Admitted to Orthopedic service Dr. Jacome  Surgery on 9/11/24 - left total knee replacement   Annie-op Antibiotics  Pain control  DVT prophylaxis  OOB/Physical Therapy

## 2024-09-12 NOTE — DISCHARGE NOTE PROVIDER - NSDCFUADDINST_GEN_ALL_CORE_FT
ACTIVITY:   - Weight bear as tolerated with assistive device. No strenuous activity, heavy lifting, driving or returning to work until cleared by MD.   - Apply a cold compress to the surgical site several times daily to reduce pain and swelling. For icing, twenty-minute sessions followed by an hour off is recommended. Ice should NEVER be placed directly on the skin. Wearing compression stockings during the first week after surgery can help reduce swelling in your knee, calf and foot, but is not required.      (KNEE REPLACEMENTS)   DO place a pillow under your heel when elevating the leg, to encourage full extension of the knee. Do NOT place a pillow behind your knee for comfort, as this can lead to permanent difficulty straightening your leg. It is normal to develop some swelling in the leg, ankle, and foot because of gravity.     DRESSING/SHOWERING:   (AQUACEL – brown gel dressing)   - You have an AQUACEL dressing: You may shower, your dressing is water-resistant. Do not soak in bathtubs. Remove dressing after postop day 7, then leave incision open to air. You may do this yourself (simply peel it off), or you may ask for assistance from your visiting nurse. Keep your incision clean and dry. Do not pick at your incision. Do not apply creams, ointments or oils to your incision until cleared by your surgeon. Do not soak your incision in sitting water (ie tubs, pools, lakes, etc.) until cleared by your surgeon. Do not scrub the incision – instead, allow soap and water to flow over the incision and then pat it dry with a clean towel.     MEDICATION/ANTICOAGULATION:   -You have been prescribed Aspirin, as a preventative to help prevent postoperative blood clots. Please take this medication as prescribed.    - You have been prescribed medications for pain:      - Tylenol for mild to moderate pain. Do not exceed 3,000mg daily.     - For more severe pain, take Tylenol with the addition of narcotic pain medication. Take this medication as prescribed. This medication may cause drowsiness or dizziness. Do not operate machinery. This medication may cause constipation.   - For any additional medications, follow instructions on the bottle.    -Try to have regular bowel movements. Take stool softener or laxative if necessary. You may wish to take Miralax daily until you have regular bowel movements.    - If you have a pain management physician, please follow-up with them postoperatively.    - If you experience any negative side effects of your medications, please call your surgeon's office to discuss.     FOLLOW-UP:   - Call to schedule an appt with Dr. Jacome for follow up.  - Please follow-up with your primary care physician or any other specialist you see postoperatively, if needed.    - Contact your doctor or go to the emergency room if you experience: fever greater than 101.5, chills, chest pain, difficulty breathing, redness or excessive drainage around the incision, other concerns.

## 2024-09-12 NOTE — DISCHARGE NOTE NURSING/CASE MANAGEMENT/SOCIAL WORK - NSDCPEFALRISK_GEN_ALL_CORE
Mid sternal chest pain with left arm numbness x40 minutes. Pt states pain started while raking leaves.  Hx double mastectomy with reconstruction d/t family hx breat cancer
For information on Fall & Injury Prevention, visit: [email or web address] OR  [email or web address] OR  https://www.cdc.gov/steadi/patient.html

## 2024-09-12 NOTE — DISCHARGE NOTE PROVIDER - NSDCMRMEDTOKEN_GEN_ALL_CORE_FT
acetaminophen 500 mg oral tablet: 2 tab(s) orally every 8 hours as needed for mild pain  Aspirin Low Dose 81 mg oral delayed release tablet: 1 tab(s) orally 2 times a day  celecoxib 200 mg oral capsule: 1 cap(s) orally every 12 hours MDD: 2  finasteride 1 mg oral tablet: 1 tab(s) orally once a day  irbesartan-hydrochlorothiazide 150 mg-12.5 mg oral tablet: 1 tab(s) orally once a day  oxyCODONE 5 mg oral tablet: 1 tab(s) orally every 4 to 6 hours as needed for moderate to severe postoperative pain may take 1-2 tablets every 4-6 hours as needed for moderate to severe postoperative pain MDD: 6  polyethylene glycol 3350 oral powder for reconstitution: 17 gram(s) orally once a day may take over the counter until bowel movement then as needed for constipation  senna leaf extract oral tablet: 2 tab(s) orally once a day (at bedtime) may take over the counter until bowel movement then as needed for constipation   acetaminophen 500 mg oral tablet: 2 tab(s) orally every 8 hours as needed for mild pain  Aspirin Low Dose 81 mg oral delayed release tablet: 1 tab(s) orally 2 times a day for blood clot prevention  celecoxib 200 mg oral capsule: 1 cap(s) orally every 12 hours MDD: 2  finasteride 1 mg oral tablet: 1 tab(s) orally once a day  irbesartan-hydrochlorothiazide 150 mg-12.5 mg oral tablet: 1 tab(s) orally once a day  oxyCODONE 5 mg oral tablet: 1 tab(s) orally every 4 to 6 hours as needed for moderate to severe postoperative pain may take 1-2 tablets every 4-6 hours as needed for moderate to severe postoperative pain MDD: 6  polyethylene glycol 3350 oral powder for reconstitution: 17 gram(s) orally once a day may take until bowel movement then as needed for constipation  senna leaf extract oral tablet: 2 tab(s) orally once a day (at bedtime) may take until bowel movement then as needed for constipation

## 2024-09-12 NOTE — DISCHARGE NOTE PROVIDER - CARE PROVIDER_API CALL
German Jacome  Orthopaedic Surgery  [address]  [place] [address]  Phone: [phone]  Fax: [phone]  Follow Up Time:

## 2024-09-12 NOTE — PROGRESS NOTE ADULT - ASSESSMENT
70 YO male with PMH HTN and two years of left knee pain, failed on conservative measures, now seen post operatively feeling comfortable, pain is controlled on pain regiment in knee, patient is denying SOB CP Dyspnea palpitations N/V fevers / chills .   Continue valsartan in place of irbesartan, with holding parameters,   check creatinine levels post op BMP  continue HCTZ with holding parameters  hemodynamics are stable  medically stable for discharge   afebrile leukocytosis likely reactionary - outpatient follow up   finasteride to continue  pain regimen, bowel regimen, DVT ppx, edith op antibiotics

## 2024-09-12 NOTE — DISCHARGE NOTE NURSING/CASE MANAGEMENT/SOCIAL WORK - PATIENT PORTAL LINK FT
You can access the FollowMyHealth Patient Portal offered by [facility] by registering at the following website: [email or web address]. By joining [email or web address]’s FollowMyHealth portal, you will also be able to view your health information using other applications (apps) compatible with our system.

## 2024-09-12 NOTE — PROGRESS NOTE ADULT - SUBJECTIVE AND OBJECTIVE BOX
HPI:   70 YO male with PMH HTN and two years of left knee pain, failed on conservative measures, now seen post operatively feeling comfortable, pain is controlled on pain regiment in knee, patient is denying SOB CP Dyspnea palpitations N/V fevers / chills .     ROS: All 12 systems reviewed and negative except for HPI   patient without overnight events   hemodynamics are stable  medically stable for discharge    left total knee arthroplasty with Dr. Jacome (10 Sep 2024 11:11)        PAST MEDICAL & SURGICAL HISTORY:  HTN (hypertension)      History of hip surgery  B/L      H/O foot surgery  B/L      H/O operation on finger  cut-and reattached  middle finger and ring          Allergies    No Known Allergies    Intolerances        MEDICATIONS  (STANDING):  acetaminophen     Tablet .. 1000 milliGRAM(s) Oral every 8 hours  aspirin  chewable 81 milliGRAM(s) Oral two times a day  ceFAZolin   IVPB 2000 milliGRAM(s) IV Intermittent every 8 hours  celecoxib 200 milliGRAM(s) Oral every 12 hours  finasteride 5 milliGRAM(s) Oral daily  influenza  Vaccine (HIGH DOSE) 0.5 milliLiter(s) IntraMuscular once  ketorolac   Injectable 15 milliGRAM(s) IV Push every 6 hours  lactated ringers. 1000 milliLiter(s) (75 mL/Hr) IV Continuous <Continuous>  multivitamin 1 Tablet(s) Oral daily  senna 2 Tablet(s) Oral at bedtime    MEDICATIONS  (PRN):  HYDROmorphone  Injectable 0.5 milliGRAM(s) IV Push every 3 hours PRN Breakthrough pain  ondansetron Injectable 4 milliGRAM(s) IV Push every 6 hours PRN Nausea and/or Vomiting  oxyCODONE    IR 10 milliGRAM(s) Oral every 3 hours PRN Severe Pain (7 - 10)  oxyCODONE    IR 5 milliGRAM(s) Oral every 3 hours PRN Moderate Pain (4 - 6)      SOCIAL HISTORY:    FAMILY HISTORY:        Vital Signs Last 24 Hrs  T(C): 36.3 (12 Sep 2024 09:10), Max: 36.4 (11 Sep 2024 20:52)  T(F): 97.4 (12 Sep 2024 09:10), Max: 97.6 (11 Sep 2024 20:52)  HR: 67 (12 Sep 2024 09:10) (53 - 67)  BP: 127/76 (12 Sep 2024 09:10) (112/68 - 127/76)  BP(mean): 93 (12 Sep 2024 09:10) (93 - 93)  RR: 17 (12 Sep 2024 09:10) (16 - 18)  SpO2: 98% (12 Sep 2024 09:10) (94% - 98%)    Parameters below as of 12 Sep 2024 09:10  Patient On (Oxygen Delivery Method): room air            I&O's Summary    11 Sep 2024 07:01  -  11 Sep 2024 15:24  --------------------------------------------------------  IN: 1360 mL / OUT: 100 mL / NET: 1260 mL        LABS:                CAPILLARY BLOOD GLUCOSE          Cultures:      PHYSICAL EXAM:  General: NAD, resting comfortably  HEENT: NC/AT, EOMI, normal hearing, no oral lesions, no LAD, neck supple  Pulmonary: Normal resp effort, CTA-B  Cardiovascular: NSR, no murmurs  Abdominal: Soft, ND/NT, no organomegaly  Extremities: (+) DP/PT pulses. s/p L TKA mild edema   Neuro: A/O x 3, CNs II-XII grossly intact, normal sensation, no focal deficits  Pulses: Palpable distal pulses    RADIOLOGY & ADDITIONAL STUDIES:      ASSESSMENT:      PLAN:        
Ortho Progress Note    Procedure: LEFT TKA   Surgeon: Dr. ALAINA Jacome    Pt comfortable without complaints, pain controlled  Denies CP, SOB, N/V, numbness/tingling     Vital Signs Last 24 Hrs  T(C): 36.4 (09-12-24 @ 05:24), Max: 36.4 (09-12-24 @ 05:24)  T(F): 97.5 (09-12-24 @ 05:24), Max: 97.5 (09-12-24 @ 05:24)  HR: 53 (09-12-24 @ 05:24) (53 - 53)  BP: 115/71 (09-12-24 @ 05:24) (115/71 - 115/71)  BP(mean): --  RR: 16 (09-12-24 @ 05:24) (16 - 16)  SpO2: 98% (09-12-24 @ 05:24) (98% - 98%)    General: Pt Alert and oriented, NAD  Aquacel DSG C/D/I  Pulses: 2+ DP  Sensation: SILT grossly SPN/DPN/Saph/Margarita/Tib  Motor: 5/5 TA/GS/EHL, Quad/Psoas firing limited 2/2 pain    Post-op X-Ray: hardware intact w/o fracture    A/P: 69yMale s/p LEFT TKA by Dr. ALAINA Jacome on 09-11  - Stable  - Pain Control  - DVT ppx: ASA 81 BID  - Post op abx: Ancef  - WBS: WBAT  - HPT pending clearance  
Orthopaedic Surgery Post Operative Check    Procedure: left total knee replacement   Surgeon: Dr. Jacome    Pt comfortable without complaints, pain controlled. States has numbness to toes at baseline.       Vital Signs Last 24 Hrs  T(C): 36.6 (09-11-24 @ 11:22), Max: 36.6 (09-11-24 @ 11:22)  T(F): 97.8 (09-11-24 @ 11:22), Max: 97.8 (09-11-24 @ 11:22)  HR: 76 (09-11-24 @ 11:55) (46 - 76)  BP: 128/67 (09-11-24 @ 11:55) (97/56 - 128/67)  BP(mean): 94 (09-11-24 @ 11:55) (71 - 94)  RR: 15 (09-11-24 @ 11:55) (12 - 24)  SpO2: 96% (09-11-24 @ 11:55) (92% - 100%)  AVSS    General: Pt Alert and oriented, NAD  DSG C/D/I left knee acewrap, overlying bags of ice   Pulses: DP pulse palpable left lower extremity   Sensation: intact to bilateral lower extremities, has diminished sensation to toes at baseline   Motor: EHL/FHL/TA/GS 5/5 bilateral lower extremities     Post-op X-Ray:  < from: Xray Knee 1 or 2 Views, Left (09.11.24 @ 09:20) >    ACC: [id number] EXAM:  XR KNEE 1-2 VIEWS LT   ORDERED BY: ANGEL MOON     PROCEDURE DATE:  09/11/2024          INTERPRETATION:  Clinical history reason for exam: Postop.    Left knee 2 views.    No comparison.    Findings/  impression: Status post left knee total arthroplasty in satisfactory   position, intra-articular and soft tissue air, joint effusion.    --- End of Report ---        JR SRIKANTH BRADY MD; Attending Radiologist  This document has been electronically signed. Sep 11 2024 10:35AM    < end of copied text >      A/P: 69yMale POD#0 s/p left TKR   - Stable  - Pain Control  - DVT ppx: ASA  - Post op abx: ancef   - PT, WBS:  wbat   - f/u AM labs     Ortho Pager [phone]

## 2024-09-19 ENCOUNTER — EMERGENCY (EMERGENCY)
Facility: HOSPITAL | Age: 69
LOS: 1 days | Discharge: HOME | End: 2024-09-19
Attending: EMERGENCY MEDICINE | Admitting: EMERGENCY MEDICINE
Payer: COMMERCIAL

## 2024-09-19 VITALS
HEIGHT: 75 IN | WEIGHT: 255.07 LBS | OXYGEN SATURATION: 95 % | TEMPERATURE: 98 F | SYSTOLIC BLOOD PRESSURE: 104 MMHG | HEART RATE: 82 BPM | RESPIRATION RATE: 18 BRPM | DIASTOLIC BLOOD PRESSURE: 71 MMHG

## 2024-09-19 VITALS
SYSTOLIC BLOOD PRESSURE: 115 MMHG | OXYGEN SATURATION: 98 % | HEART RATE: 89 BPM | RESPIRATION RATE: 18 BRPM | DIASTOLIC BLOOD PRESSURE: 70 MMHG

## 2024-09-19 DIAGNOSIS — R20.0 ANESTHESIA OF SKIN: ICD-10-CM

## 2024-09-19 DIAGNOSIS — Z72.0 TOBACCO USE: ICD-10-CM

## 2024-09-19 DIAGNOSIS — M17.12 UNILATERAL PRIMARY OSTEOARTHRITIS, LEFT KNEE: ICD-10-CM

## 2024-09-19 DIAGNOSIS — Z98.890 OTHER SPECIFIED POSTPROCEDURAL STATES: Chronic | ICD-10-CM

## 2024-09-19 DIAGNOSIS — I10 ESSENTIAL (PRIMARY) HYPERTENSION: ICD-10-CM

## 2024-09-19 DIAGNOSIS — Z96.652 PRESENCE OF LEFT ARTIFICIAL KNEE JOINT: Chronic | ICD-10-CM

## 2024-09-19 LAB
ALBUMIN SERPL ELPH-MCNC: 3.4 G/DL — SIGNIFICANT CHANGE UP (ref 3.3–5)
ALP SERPL-CCNC: 77 U/L — SIGNIFICANT CHANGE UP (ref 40–120)
ALT FLD-CCNC: 20 U/L — SIGNIFICANT CHANGE UP (ref 10–45)
ANION GAP SERPL CALC-SCNC: 8 MMOL/L — SIGNIFICANT CHANGE UP (ref 5–17)
AST SERPL-CCNC: 17 U/L — SIGNIFICANT CHANGE UP (ref 10–40)
BASOPHILS # BLD AUTO: 0.12 K/UL — SIGNIFICANT CHANGE UP (ref 0–0.2)
BASOPHILS NFR BLD AUTO: 1 % — SIGNIFICANT CHANGE UP (ref 0–2)
BILIRUB SERPL-MCNC: 1 MG/DL — SIGNIFICANT CHANGE UP (ref 0.2–1.2)
BUN SERPL-MCNC: 35 MG/DL — HIGH (ref 7–23)
CALCIUM SERPL-MCNC: 9.4 MG/DL — SIGNIFICANT CHANGE UP (ref 8.4–10.5)
CHLORIDE SERPL-SCNC: 101 MMOL/L — SIGNIFICANT CHANGE UP (ref 96–108)
CO2 SERPL-SCNC: 28 MMOL/L — SIGNIFICANT CHANGE UP (ref 22–31)
CREAT SERPL-MCNC: 0.98 MG/DL — SIGNIFICANT CHANGE UP (ref 0.5–1.3)
EGFR: 85 ML/MIN/1.73M2 — SIGNIFICANT CHANGE UP
EOSINOPHIL # BLD AUTO: 0.59 K/UL — HIGH (ref 0–0.5)
EOSINOPHIL NFR BLD AUTO: 5.1 % — SIGNIFICANT CHANGE UP (ref 0–6)
ETHANOL SERPL-MCNC: <3 MG/DL — SIGNIFICANT CHANGE UP (ref 0–3)
GLUCOSE SERPL-MCNC: 114 MG/DL — HIGH (ref 70–99)
HCT VFR BLD CALC: 34.3 % — LOW (ref 39–50)
HGB BLD-MCNC: 11.4 G/DL — LOW (ref 13–17)
IMM GRANULOCYTES NFR BLD AUTO: 1.6 % — HIGH (ref 0–0.9)
LYMPHOCYTES # BLD AUTO: 18.6 % — SIGNIFICANT CHANGE UP (ref 13–44)
LYMPHOCYTES # BLD AUTO: 2.14 K/UL — SIGNIFICANT CHANGE UP (ref 1–3.3)
MCHC RBC-ENTMCNC: 31 PG — SIGNIFICANT CHANGE UP (ref 27–34)
MCHC RBC-ENTMCNC: 33.2 GM/DL — SIGNIFICANT CHANGE UP (ref 32–36)
MCV RBC AUTO: 93.2 FL — SIGNIFICANT CHANGE UP (ref 80–100)
MONOCYTES # BLD AUTO: 0.89 K/UL — SIGNIFICANT CHANGE UP (ref 0–0.9)
MONOCYTES NFR BLD AUTO: 7.7 % — SIGNIFICANT CHANGE UP (ref 2–14)
NEUTROPHILS # BLD AUTO: 7.59 K/UL — HIGH (ref 1.8–7.4)
NEUTROPHILS NFR BLD AUTO: 66 % — SIGNIFICANT CHANGE UP (ref 43–77)
NRBC # BLD: 0 /100 WBCS — SIGNIFICANT CHANGE UP (ref 0–0)
PLATELET # BLD AUTO: 453 K/UL — HIGH (ref 150–400)
POTASSIUM SERPL-MCNC: 4.4 MMOL/L — SIGNIFICANT CHANGE UP (ref 3.5–5.3)
POTASSIUM SERPL-SCNC: 4.4 MMOL/L — SIGNIFICANT CHANGE UP (ref 3.5–5.3)
PROT SERPL-MCNC: 8 G/DL — SIGNIFICANT CHANGE UP (ref 6–8.3)
RBC # BLD: 3.68 M/UL — LOW (ref 4.2–5.8)
RBC # FLD: 12.5 % — SIGNIFICANT CHANGE UP (ref 10.3–14.5)
SODIUM SERPL-SCNC: 137 MMOL/L — SIGNIFICANT CHANGE UP (ref 135–145)
WBC # BLD: 11.51 K/UL — HIGH (ref 3.8–10.5)
WBC # FLD AUTO: 11.51 K/UL — HIGH (ref 3.8–10.5)

## 2024-09-19 PROCEDURE — 36415 COLL VENOUS BLD VENIPUNCTURE: CPT

## 2024-09-19 PROCEDURE — 99284 EMERGENCY DEPT VISIT MOD MDM: CPT

## 2024-09-19 PROCEDURE — 80053 COMPREHEN METABOLIC PANEL: CPT

## 2024-09-19 PROCEDURE — 80307 DRUG TEST PRSMV CHEM ANLYZR: CPT

## 2024-09-19 PROCEDURE — 93010 ELECTROCARDIOGRAM REPORT: CPT

## 2024-09-19 PROCEDURE — 99283 EMERGENCY DEPT VISIT LOW MDM: CPT | Mod: 25

## 2024-09-19 PROCEDURE — 85025 COMPLETE CBC W/AUTO DIFF WBC: CPT

## 2024-09-19 PROCEDURE — 93005 ELECTROCARDIOGRAM TRACING: CPT

## 2024-09-19 RX ORDER — SODIUM CHLORIDE 9 MG/ML
500 INJECTION INTRAMUSCULAR; INTRAVENOUS; SUBCUTANEOUS ONCE
Refills: 0 | Status: COMPLETED | OUTPATIENT
Start: 2024-09-19 | End: 2024-09-19

## 2024-09-19 RX ADMIN — SODIUM CHLORIDE 1000 MILLILITER(S): 9 INJECTION INTRAMUSCULAR; INTRAVENOUS; SUBCUTANEOUS at 17:30

## 2024-09-19 NOTE — ED PROVIDER NOTE - CLINICAL SUMMARY MEDICAL DECISION MAKING FREE TEXT BOX
69 year old with a past medical history of HTN and recent left knee replacement presenting with syncopal episode, likely due to dehydration .  Comprehensive neuro and cardiac exam unremarkable , EKG - NSR and non-ischemic.   Less likely PE as patient is breathing normal in room air , no accessory muscle use or tachycardia.   Based on patient presentation , physical exam and labs, patient likely had syncopal episode due to dehydration. Treated with IV fluids and instructed patient to follow up with PCP within 24 hours. 69 year old with a past medical history of HTN and recent left knee replacement presenting with syncopal episode, likely due to dehydration .  Comprehensive neuro and cardiac exam unremarkable , EKG - NSR and non-ischemic.   Less likely PE as patient is breathing normal in room air , no accessory muscle use or tachycardia.   Based on patient presentation , physical exam and labs, patient likely had syncopal episode due to dehydration. Treated with IV fluids and instructed patient to follow up with PCP within 24 hours.    DT: I have personally performed a face to face diagnostic evaluation on this patient.  I have reviewed the NP student's note and agree with the history, exam, and plan of care, except as noted.  History and Exam by me shows 69 year old male with a past medical history of HTN and recent left total knee replacement , presenting to the ED today with syncopal episode lasting a few seconds. As per the patient he was having dinner with family in a restaurant , and he suddenly felt light headed and passed out. Patient was lowered down by family and denies hitting head . Patient reports feeling hot while in the restaurant and did not drink enough water today. Patient had a cup of water and 1 beer prior to the episode. Patient is awake , alert and oriented x 3 , denies similar events in the past. He underwent left knee total replacement last Wednesday and is on Oxycodone and celebrex PRN , last dose of oxy was 9 pm yesterday. Patient denies chest pain , palpitations , neck pain ,Shortness of breath , palpitations , GI bleed . Denies any allergies or recent travel.  Patient is NAD.  A n O x 3. Head NC/AT. Lungs cta bl. Heart s1,s2, rrr, no murmurs. Abd-soft, nt, no guarding, no rebound, no distension, no cva tenderness. Ext- FROM actively,  ambulating s any difficulty.  Labs  were unremarkable except for bun 35.

## 2024-09-19 NOTE — ED PROVIDER NOTE - NSTIMEPROVIDERCAREINITIATE_GEN_ER
PATIENTS ONCOLOGY RECORD LOCATED IN [facility]      Subjective     Name:  EPI MCBRIDE     Date:  2019  Address:   [address] [place] [address]  Home: [unfilled]  :  1962 AGE:  56 y.o.        RECORDS OBTAINED:  Patients Oncology Record is located in [facility]  
19-Sep-2024 17:02

## 2024-09-19 NOTE — ED PROVIDER NOTE - PATIENT PORTAL LINK FT
You can access the FollowMyHealth Patient Portal offered by [facility] by registering at the following website: [email or web address]. By joining [employer]’s FollowMyHealth portal, you will also be able to view your health information using other applications (apps) compatible with our system.

## 2024-09-19 NOTE — ED PROVIDER NOTE - NS ED ATTENDING STATEMENT MOD
I have seen and examined this patient and fully participated in the care of this patient as the teaching attending.  The service was shared with the DOC.  I reviewed and verified the documentation.

## 2024-09-19 NOTE — ED ADULT NURSE NOTE - NSFALLRISKINTERV_ED_ALL_ED
Assistance OOB with selected safe patient handling equipment if applicable/Communicate fall risk and risk factors to all staff, patient, and family/Orthostatic vital signs/Provide patient with walking aids/Provide visual cue: yellow wristband, yellow gown, etc/Reinforce activity limits and safety measures with patient and family/Call bell, personal items and telephone in reach/Instruct patient to call for assistance before getting out of bed/chair/stretcher/Non-slip footwear applied when patient is off stretcher/[place] to call system/Physically safe environment - no spills, clutter or unnecessary equipment/Purposeful Proactive Rounding/Room/bathroom lighting operational, light cord in reach

## 2024-09-19 NOTE — ED ADULT NURSE NOTE - OBJECTIVE STATEMENT
Patient received A&Ox4, BIBEMS. Patient complains of syncopal episode prior to arrival. Patient with new medication changes r/t recent knee surgery. Patient also started on amlodipine. Patient states he had one beer and then felt faint and was lowered to ground. No injuries sustained, assessed or endorsed. 20G IV placed to RAC and labs drawn as ordered. Cardiac monitor applied. Side rails up, call light in reach, safety maintained, comfort measures provided and MD evaluation in progress.

## 2024-09-19 NOTE — ED PROVIDER NOTE - CARE PLAN
1 Principal Discharge DX:	Dehydration  Assessment and plan of treatment:	Mild dehydration.   Administer 500 ml IVF normal Saline.

## 2024-09-19 NOTE — ED PROVIDER NOTE - CRANIAL NERVE AND PUPILLARY EXAM
cranial nerves 2-12 intact/cough reflex intact cranial nerves 2-12 intact/cough reflex intact/central and peripheral vision intact/extra-ocular movements intact/tongue is midline

## 2024-09-19 NOTE — ED PROVIDER NOTE - SKIN, MLM
Skin normal color for race, warm, dry and intact. No evidence of rash. Left Knee surgical incision clean , dry and intact.

## 2024-09-19 NOTE — ED ADULT NURSE NOTE - NS_ED_NURSE_TEACHING_TOPIC_ED_A_ED
Follow up with Primary Care Physician. For new or worsening symptoms, please return to Emergency Department.

## 2024-09-19 NOTE — ED PROVIDER NOTE - OBJECTIVE STATEMENT
is a 69 year old male with a past medical history of HTN and recent left total knee replacement , presenting to the ED today with syncopal episode lasting a few seconds. As per the patient he was having dinner with family in a restaurant , and he suddenly felt light headed and passed out. Patient was lowered down by family and denies hitting head . Patient reports feeling hot while in the restaurant and did not drink enough water today. Patient had a cup of water and 1 beer prior to the episode. Patient denies similar events in the past.He underwent left knee total replacement last Wednesday and is on Oxycodone and celebrex PRN , last dose of oxy was 9 pm yesterday. Patient denies Shortness of breath , palpitations , GI bleed . Denies any allergies or recent travel.  is a 69 year old male with a past medical history of HTN and recent left total knee replacement , presenting to the ED today with syncopal episode lasting a few seconds. As per the patient he was having dinner with family in a restaurant , and he suddenly felt light headed and passed out. Patient was lowered down by family and denies hitting head . Patient reports feeling hot while in the restaurant and did not drink enough water today. Patient had a cup of water and 1 beer prior to the episode. Patient is awake , alert and oriented x 3 , denies similar events in the past. He underwent left knee total replacement last Wednesday and is on Oxycodone and celebrex PRN , last dose of oxy was 9 pm yesterday. Patient denies chest pain , palpitations , neck pain ,Shortness of breath , palpitations , GI bleed . Denies any allergies or recent travel.

## 2024-09-19 NOTE — ED ADULT TRIAGE NOTE - CHIEF COMPLAINT QUOTE
Patient BIB EMS from [facility] s/p syncopal episode. Patient had knee surgery last week, has been taking oxycodone and celebrex in addition to his amlodipine, but did not take the oxycodone today. Patient had one beer at the [facility] prior to syncopizing. Patient felt faint and was lowered to ground, did not fall, no head strike. Patient takes aspirin daily.

## 2024-09-19 NOTE — ED ADULT NURSE NOTE - CHIEF COMPLAINT QUOTE
Patient BIB EMS from [facility] s/p syncopal episode. Patient had knee surgery last week, has been taking oxycodone and celebrex in addition to his amlodipine, but did not take the oxycodone today. Patient had one beer at the [facility] prior to syncopizing. Patient felt faint and was lowered to ground, did not fall, no head strike. Patient takes aspirin daily.

## 2024-09-24 NOTE — CHART NOTE - NSCHARTNOTEFT_GEN_A_CORE
69 y o male presenting to the ED on 9/19/24 for syncope as per chart.  DULCE made a call to assist with scheduling the recommended primary care follow up appointment.  The patient was admitted at [facility] at the time DULCE called.  The patient reported that he was admitted to [facility] the next day after discharge from [facility].  The patient stated that he had internal bleeding at the time of his admission.

## 2024-10-02 PROBLEM — I10 ESSENTIAL (PRIMARY) HYPERTENSION: Chronic | Status: ACTIVE | Noted: 2024-09-10

## 2024-10-31 ENCOUNTER — NON-APPOINTMENT (OUTPATIENT)
Age: 69
End: 2024-10-31

## 2024-10-31 ENCOUNTER — APPOINTMENT (OUTPATIENT)
Dept: ORTHOPEDIC SURGERY | Facility: CLINIC | Age: 69
End: 2024-10-31
Payer: OTHER MISCELLANEOUS

## 2024-10-31 DIAGNOSIS — M48.061 SPINAL STENOSIS, LUMBAR REGION WITHOUT NEUROGENIC CLAUDICATION: ICD-10-CM

## 2024-10-31 PROCEDURE — 99214 OFFICE O/P EST MOD 30 MIN: CPT

## 2024-10-31 RX ORDER — OXYCODONE AND ACETAMINOPHEN 5; 325 MG/1; MG/1
5-325 TABLET ORAL
Qty: 60 | Refills: 0 | Status: ACTIVE | COMMUNITY
Start: 2024-10-31 | End: 1900-01-01

## 2024-11-01 ENCOUNTER — NON-APPOINTMENT (OUTPATIENT)
Age: 69
End: 2024-11-01

## 2025-02-13 ENCOUNTER — APPOINTMENT (OUTPATIENT)
Dept: ORTHOPEDIC SURGERY | Facility: CLINIC | Age: 70
End: 2025-02-13
Payer: OTHER MISCELLANEOUS

## 2025-02-13 VITALS
BODY MASS INDEX: 31.83 KG/M2 | OXYGEN SATURATION: 95 % | WEIGHT: 256 LBS | HEIGHT: 75 IN | TEMPERATURE: 98 F | SYSTOLIC BLOOD PRESSURE: 144 MMHG | HEART RATE: 75 BPM | DIASTOLIC BLOOD PRESSURE: 92 MMHG

## 2025-02-13 DIAGNOSIS — M48.061 SPINAL STENOSIS, LUMBAR REGION WITHOUT NEUROGENIC CLAUDICATION: ICD-10-CM

## 2025-02-13 DIAGNOSIS — M54.50 LOW BACK PAIN, UNSPECIFIED: ICD-10-CM

## 2025-02-13 PROCEDURE — 99214 OFFICE O/P EST MOD 30 MIN: CPT

## 2025-05-15 ENCOUNTER — APPOINTMENT (OUTPATIENT)
Dept: ORTHOPEDIC SURGERY | Facility: CLINIC | Age: 70
End: 2025-05-15
Payer: OTHER MISCELLANEOUS

## 2025-05-15 VITALS
OXYGEN SATURATION: 97 % | BODY MASS INDEX: 31.83 KG/M2 | HEART RATE: 68 BPM | WEIGHT: 256 LBS | HEIGHT: 75 IN | DIASTOLIC BLOOD PRESSURE: 88 MMHG | SYSTOLIC BLOOD PRESSURE: 150 MMHG | TEMPERATURE: 98 F

## 2025-05-15 DIAGNOSIS — M48.061 SPINAL STENOSIS, LUMBAR REGION WITHOUT NEUROGENIC CLAUDICATION: ICD-10-CM

## 2025-05-15 PROCEDURE — 99214 OFFICE O/P EST MOD 30 MIN: CPT

## 2025-05-15 RX ORDER — NALOXONE HYDROCHLORIDE NASAL 4 MG/.1ML
4 SPRAY NASAL
Qty: 1 | Refills: 0 | Status: ACTIVE | COMMUNITY
Start: 2025-05-15 | End: 1900-01-01

## 2025-06-26 ENCOUNTER — APPOINTMENT (OUTPATIENT)
Dept: ORTHOPEDIC SURGERY | Facility: CLINIC | Age: 70
End: 2025-06-26
Payer: OTHER MISCELLANEOUS

## 2025-06-26 VITALS
OXYGEN SATURATION: 98 % | SYSTOLIC BLOOD PRESSURE: 128 MMHG | BODY MASS INDEX: 31.83 KG/M2 | HEART RATE: 68 BPM | DIASTOLIC BLOOD PRESSURE: 84 MMHG | HEIGHT: 75 IN | WEIGHT: 256 LBS

## 2025-06-26 PROCEDURE — 99214 OFFICE O/P EST MOD 30 MIN: CPT

## 2025-08-07 ENCOUNTER — APPOINTMENT (OUTPATIENT)
Dept: ORTHOPEDIC SURGERY | Facility: CLINIC | Age: 70
End: 2025-08-07
Payer: OTHER MISCELLANEOUS

## 2025-08-07 VITALS
OXYGEN SATURATION: 96 % | TEMPERATURE: 96 F | HEIGHT: 75 IN | SYSTOLIC BLOOD PRESSURE: 148 MMHG | BODY MASS INDEX: 31.83 KG/M2 | WEIGHT: 256 LBS | HEART RATE: 70 BPM | DIASTOLIC BLOOD PRESSURE: 89 MMHG

## 2025-08-07 DIAGNOSIS — M48.061 SPINAL STENOSIS, LUMBAR REGION WITHOUT NEUROGENIC CLAUDICATION: ICD-10-CM

## 2025-08-07 PROCEDURE — 99214 OFFICE O/P EST MOD 30 MIN: CPT

## 2025-09-12 ENCOUNTER — LABORATORY RESULT (OUTPATIENT)
Age: 70
End: 2025-09-12

## 2025-09-12 ENCOUNTER — NON-APPOINTMENT (OUTPATIENT)
Age: 70
End: 2025-09-12

## 2025-09-12 ENCOUNTER — RESULT REVIEW (OUTPATIENT)
Age: 70
End: 2025-09-12

## 2025-09-12 ENCOUNTER — APPOINTMENT (OUTPATIENT)
Dept: ORTHOPEDIC SURGERY | Facility: CLINIC | Age: 70
End: 2025-09-12
Payer: OTHER MISCELLANEOUS

## 2025-09-12 VITALS
OXYGEN SATURATION: 96 % | DIASTOLIC BLOOD PRESSURE: 80 MMHG | SYSTOLIC BLOOD PRESSURE: 120 MMHG | HEIGHT: 75 IN | WEIGHT: 260 LBS | HEART RATE: 67 BPM | BODY MASS INDEX: 32.33 KG/M2

## 2025-09-12 DIAGNOSIS — Z47.1 AFTERCARE FOLLOWING JOINT REPLACEMENT SURGERY: ICD-10-CM

## 2025-09-12 DIAGNOSIS — M17.11 UNILATERAL PRIMARY OSTEOARTHRITIS, RIGHT KNEE: ICD-10-CM

## 2025-09-12 DIAGNOSIS — Z96.652 AFTERCARE FOLLOWING JOINT REPLACEMENT SURGERY: ICD-10-CM

## 2025-09-12 DIAGNOSIS — Z96.643 AFTERCARE FOLLOWING JOINT REPLACEMENT SURGERY: ICD-10-CM

## 2025-09-12 DIAGNOSIS — Z78.9 OTHER SPECIFIED HEALTH STATUS: ICD-10-CM

## 2025-09-12 DIAGNOSIS — Z86.79 PERSONAL HISTORY OF OTHER DISEASES OF THE CIRCULATORY SYSTEM: ICD-10-CM

## 2025-09-12 PROCEDURE — 99204 OFFICE O/P NEW MOD 45 MIN: CPT | Mod: 25

## 2025-09-12 PROCEDURE — 20610 DRAIN/INJ JOINT/BURSA W/O US: CPT | Mod: LT

## 2025-09-14 LAB
B PERT IGG+IGM PNL SER: ABNORMAL
COLOR FLD: ABNORMAL
FLUID INTAKE SUBSTANCE CLASS: NORMAL
JOINT PCR PANEL: NOT DETECTED
JOINT PCR SOURCE: NORMAL
LYMPHOCYTES # FLD MANUAL: 74 %
Lab: 100 CELLS
MONOS+MACROS NFR FLD MANUAL: 24 %
NEUTS SEG # FLD MANUAL: 2 %
RBC # FLD MANUAL: NORMAL CELLS/UL
SYCRY CLARITY: NORMAL
SYCRY COLOR: ABNORMAL
SYCRY ID: NORMAL
SYCRY TUBE: NORMAL
TOTAL CELLS COUNTED FLD: 471 CELLS/UL
TUBE TYPE: NORMAL
WBC COUNT: 439 CELLS/UL

## 2025-09-15 ENCOUNTER — NON-APPOINTMENT (OUTPATIENT)
Age: 70
End: 2025-09-15

## 2025-09-15 PROBLEM — Z86.79 HISTORY OF ESSENTIAL HYPERTENSION: Status: RESOLVED | Noted: 2025-09-15 | Resolved: 2025-09-15

## 2025-09-15 PROBLEM — Z78.9 CONSUMES ALCOHOL: Status: ACTIVE | Noted: 2025-09-15

## 2025-09-15 PROBLEM — Z78.9 NON-SMOKER: Status: ACTIVE | Noted: 2025-09-15

## 2025-09-15 RX ORDER — FINASTERIDE 5 MG/1
5 TABLET, FILM COATED ORAL
Refills: 0 | Status: ACTIVE | COMMUNITY

## 2025-09-17 LAB
BASOPHILS # BLD AUTO: 0.09 K/UL
BASOPHILS NFR BLD AUTO: 1.2 %
CRP SERPL-MCNC: 3 MG/L
EOSINOPHIL # BLD AUTO: 0.44 K/UL
EOSINOPHIL NFR BLD AUTO: 5.7 %
ERYTHROCYTE [SEDIMENTATION RATE] IN BLOOD BY WESTERGREN METHOD: 13 MM/HR
HCT VFR BLD CALC: 43.4 %
HGB BLD-MCNC: 14.3 G/DL
IMM GRANULOCYTES NFR BLD AUTO: 0.4 %
LYMPHOCYTES # BLD AUTO: 2.16 K/UL
LYMPHOCYTES NFR BLD AUTO: 28.2 %
MAN DIFF?: NORMAL
MCHC RBC-ENTMCNC: 31 PG
MCHC RBC-ENTMCNC: 32.9 G/DL
MCV RBC AUTO: 93.9 FL
MONOCYTES # BLD AUTO: 0.67 K/UL
MONOCYTES NFR BLD AUTO: 8.7 %
NEUTROPHILS # BLD AUTO: 4.27 K/UL
NEUTROPHILS NFR BLD AUTO: 55.8 %
PLATELET # BLD AUTO: 291 K/UL
RBC # BLD: 4.62 M/UL
RBC # FLD: 13.2 %
WBC # FLD AUTO: 7.66 K/UL

## 2025-09-18 LAB — JOINT CULTURE: NORMAL

## (undated) DEVICE — STRYKER 4-PORT MANIFOLD W/SPECIMEN COLLECTION

## (undated) DEVICE — HOOD T7 PLUS PEELAWAY

## (undated) DEVICE — PACK TOTAL KNEE

## (undated) DEVICE — PREP BETADINE KIT

## (undated) DEVICE — SAW BLADE STRYKER SAGITTAL 25X86.5X1.32MM

## (undated) DEVICE — ELCTR STRYKER NEPTUNE SMOKE EVACUATION PENCIL (GREEN)

## (undated) DEVICE — WOUND IRR IRRISEPT W 0.5 CHG

## (undated) DEVICE — DRSG WEBRIL 6"

## (undated) DEVICE — SUT ETHILON 3-0 18" PS-1

## (undated) DEVICE — DRSG COBAN 6"

## (undated) DEVICE — DRAPE 3/4 SHEET 52X76"

## (undated) DEVICE — DRAPE 1/2 SHEET 40X57"

## (undated) DEVICE — SAW BLADE STRYKER SAGITTAL DUAL CUT TEETH 35X64X.64MM

## (undated) DEVICE — STAPLER SKIN PROXIMATE

## (undated) DEVICE — SUT VICRYL PLUS 2-0 27" CT-1 UNDYED

## (undated) DEVICE — DRSG ACE BANDAGE 6"

## (undated) DEVICE — VENODYNE/SCD SLEEVE CALF MEDIUM

## (undated) DEVICE — SUT VICRYL 2-0 27" CT-1

## (undated) DEVICE — SUT STRATAFIX SPIRAL PDS PLUS 1 30X30CM OS-6 VIOLET

## (undated) DEVICE — GLV 7.5 PROTEXIS (WHITE)

## (undated) DEVICE — WARMING BLANKET UPPER ADULT

## (undated) DEVICE — SPECIMEN CONTAINER 4OZ

## (undated) DEVICE — SUT ETHIBOND 1 30" OS8

## (undated) DEVICE — DRSG AQUACEL 3.5 X 10"

## (undated) DEVICE — SAW BLADE STRYKER SAGITTAL 81.5X12.5X1.19MM

## (undated) DEVICE — SUT STRATAFIX SPIRAL PDS PLUS 0 23X23CM CP-2 VIOLET

## (undated) DEVICE — GLV 8 PROTEXIS (WHITE)

## (undated) DEVICE — SUT STRATAFIX SYMMETRIC PDS 1 45CM OS-6

## (undated) DEVICE — SUT VICRYL 0 36" CT-1 UNDYED

## (undated) DEVICE — PREP CHLORAPREP HI-LITE ORANGE 26ML

## (undated) DEVICE — POSITIONER FOAM EGG CRATE ULNAR 2PCS (PINK)

## (undated) DEVICE — SUT STRATAFIX SPIRAL MONOCRYL PLUS 3-0 30CM PS-1 UNDYED